# Patient Record
Sex: FEMALE | Race: WHITE | Employment: UNEMPLOYED | URBAN - METROPOLITAN AREA
[De-identification: names, ages, dates, MRNs, and addresses within clinical notes are randomized per-mention and may not be internally consistent; named-entity substitution may affect disease eponyms.]

---

## 2019-03-03 ENCOUNTER — HOSPITAL ENCOUNTER (OUTPATIENT)
Age: 64
Setting detail: OBSERVATION
Discharge: HOME | End: 2019-03-06
Attending: EMERGENCY MEDICINE | Admitting: FAMILY MEDICINE
Payer: MEDICARE

## 2019-03-03 ENCOUNTER — APPOINTMENT (OUTPATIENT)
Dept: CT IMAGING | Age: 64
End: 2019-03-03
Attending: EMERGENCY MEDICINE
Payer: MEDICARE

## 2019-03-03 DIAGNOSIS — R41.82 ALTERED MENTAL STATUS, UNSPECIFIED ALTERED MENTAL STATUS TYPE: Primary | ICD-10-CM

## 2019-03-03 PROBLEM — J45.901 ASTHMA EXACERBATION: Status: ACTIVE | Noted: 2019-03-03

## 2019-03-03 PROBLEM — G93.40 ENCEPHALOPATHY: Status: ACTIVE | Noted: 2019-03-03

## 2019-03-03 LAB
ALBUMIN SERPL-MCNC: 4.4 G/DL (ref 3.2–4.6)
ALBUMIN/GLOB SERPL: 1.1 {RATIO}
ALP SERPL-CCNC: 69 U/L (ref 50–136)
ALT SERPL-CCNC: 25 U/L (ref 12–65)
ANION GAP SERPL CALC-SCNC: 10 MMOL/L
AST SERPL-CCNC: 33 U/L (ref 15–37)
BASOPHILS # BLD: 0.1 K/UL (ref 0–0.2)
BASOPHILS NFR BLD: 1 % (ref 0–2)
BILIRUB SERPL-MCNC: 0.6 MG/DL (ref 0.2–1.1)
BUN SERPL-MCNC: 20 MG/DL (ref 8–23)
CALCIUM SERPL-MCNC: 9.9 MG/DL (ref 8.3–10.4)
CHLORIDE SERPL-SCNC: 106 MMOL/L (ref 98–107)
CO2 SERPL-SCNC: 20 MMOL/L (ref 21–32)
CREAT SERPL-MCNC: 0.86 MG/DL (ref 0.6–1)
DIFFERENTIAL METHOD BLD: ABNORMAL
EOSINOPHIL # BLD: 0 K/UL (ref 0–0.8)
EOSINOPHIL NFR BLD: 0 % (ref 0.5–7.8)
ERYTHROCYTE [DISTWIDTH] IN BLOOD BY AUTOMATED COUNT: 13.7 % (ref 11.9–14.6)
GLOBULIN SER CALC-MCNC: 4 G/DL (ref 2.3–3.5)
GLUCOSE SERPL-MCNC: 110 MG/DL (ref 65–100)
HCT VFR BLD AUTO: 44 % (ref 35.8–46.3)
HGB BLD-MCNC: 15 G/DL (ref 11.7–15.4)
IMM GRANULOCYTES # BLD AUTO: 0 K/UL (ref 0–0.5)
IMM GRANULOCYTES NFR BLD AUTO: 0 % (ref 0–5)
LYMPHOCYTES # BLD: 3.1 K/UL (ref 0.5–4.6)
LYMPHOCYTES NFR BLD: 34 % (ref 13–44)
MCH RBC QN AUTO: 29.2 PG (ref 26.1–32.9)
MCHC RBC AUTO-ENTMCNC: 34.1 G/DL (ref 31.4–35)
MCV RBC AUTO: 85.8 FL (ref 79.6–97.8)
MONOCYTES # BLD: 0.6 K/UL (ref 0.1–1.3)
MONOCYTES NFR BLD: 7 % (ref 4–12)
NEUTS SEG # BLD: 5.5 K/UL (ref 1.7–8.2)
NEUTS SEG NFR BLD: 59 % (ref 43–78)
NRBC # BLD: 0 K/UL (ref 0–0.2)
PLATELET # BLD AUTO: 256 K/UL (ref 150–450)
PMV BLD AUTO: 10.8 FL (ref 9.4–12.3)
POTASSIUM SERPL-SCNC: 4.9 MMOL/L (ref 3.5–5.1)
PROT SERPL-MCNC: 8.4 G/DL
RBC # BLD AUTO: 5.13 M/UL (ref 4.05–5.2)
SODIUM SERPL-SCNC: 136 MMOL/L (ref 136–145)
TROPONIN I SERPL-MCNC: <0.02 NG/ML (ref 0.02–0.05)
WBC # BLD AUTO: 9.3 K/UL (ref 4.3–11.1)

## 2019-03-03 PROCEDURE — 99218 HC RM OBSERVATION: CPT

## 2019-03-03 PROCEDURE — 96374 THER/PROPH/DIAG INJ IV PUSH: CPT

## 2019-03-03 PROCEDURE — 74011250636 HC RX REV CODE- 250/636: Performed by: EMERGENCY MEDICINE

## 2019-03-03 PROCEDURE — 80053 COMPREHEN METABOLIC PANEL: CPT

## 2019-03-03 PROCEDURE — 85025 COMPLETE CBC W/AUTO DIFF WBC: CPT

## 2019-03-03 PROCEDURE — 84484 ASSAY OF TROPONIN QUANT: CPT

## 2019-03-03 PROCEDURE — 96360 HYDRATION IV INFUSION INIT: CPT | Performed by: EMERGENCY MEDICINE

## 2019-03-03 PROCEDURE — 96375 TX/PRO/DX INJ NEW DRUG ADDON: CPT

## 2019-03-03 PROCEDURE — 99285 EMERGENCY DEPT VISIT HI MDM: CPT | Performed by: EMERGENCY MEDICINE

## 2019-03-03 PROCEDURE — 70450 CT HEAD/BRAIN W/O DYE: CPT

## 2019-03-03 PROCEDURE — 96361 HYDRATE IV INFUSION ADD-ON: CPT | Performed by: EMERGENCY MEDICINE

## 2019-03-03 PROCEDURE — 74011250636 HC RX REV CODE- 250/636: Performed by: FAMILY MEDICINE

## 2019-03-03 PROCEDURE — 93005 ELECTROCARDIOGRAM TRACING: CPT | Performed by: EMERGENCY MEDICINE

## 2019-03-03 PROCEDURE — 77030020263 HC SOL INJ SOD CL0.9% LFCR 1000ML

## 2019-03-03 RX ORDER — SODIUM CHLORIDE 0.9 % (FLUSH) 0.9 %
5-40 SYRINGE (ML) INJECTION AS NEEDED
Status: DISCONTINUED | OUTPATIENT
Start: 2019-03-03 | End: 2019-03-06 | Stop reason: HOSPADM

## 2019-03-03 RX ORDER — BISACODYL 5 MG
5 TABLET, DELAYED RELEASE (ENTERIC COATED) ORAL DAILY PRN
Status: DISCONTINUED | OUTPATIENT
Start: 2019-03-03 | End: 2019-03-05

## 2019-03-03 RX ORDER — HYDROMORPHONE HYDROCHLORIDE 4 MG/1
4 TABLET ORAL 2 TIMES DAILY
COMMUNITY

## 2019-03-03 RX ORDER — SODIUM CHLORIDE 0.9 % (FLUSH) 0.9 %
5-40 SYRINGE (ML) INJECTION EVERY 8 HOURS
Status: DISCONTINUED | OUTPATIENT
Start: 2019-03-04 | End: 2019-03-06 | Stop reason: HOSPADM

## 2019-03-03 RX ORDER — HYDROMORPHONE HYDROCHLORIDE 2 MG/ML
0.5 INJECTION, SOLUTION INTRAMUSCULAR; INTRAVENOUS; SUBCUTANEOUS
Status: DISCONTINUED | OUTPATIENT
Start: 2019-03-03 | End: 2019-03-05

## 2019-03-03 RX ORDER — DIPHENHYDRAMINE HYDROCHLORIDE 50 MG/ML
25 INJECTION, SOLUTION INTRAMUSCULAR; INTRAVENOUS
Status: DISCONTINUED | OUTPATIENT
Start: 2019-03-03 | End: 2019-03-06 | Stop reason: HOSPADM

## 2019-03-03 RX ORDER — ONDANSETRON 2 MG/ML
4 INJECTION INTRAMUSCULAR; INTRAVENOUS
Status: DISCONTINUED | OUTPATIENT
Start: 2019-03-03 | End: 2019-03-06 | Stop reason: HOSPADM

## 2019-03-03 RX ORDER — HYDROMORPHONE HYDROCHLORIDE 2 MG/ML
1 INJECTION, SOLUTION INTRAMUSCULAR; INTRAVENOUS; SUBCUTANEOUS ONCE
Status: COMPLETED | OUTPATIENT
Start: 2019-03-04 | End: 2019-03-03

## 2019-03-03 RX ORDER — PROMETHAZINE HYDROCHLORIDE 25 MG/1
25 TABLET ORAL
COMMUNITY

## 2019-03-03 RX ORDER — ACETAMINOPHEN 325 MG/1
650 TABLET ORAL
Status: DISCONTINUED | OUTPATIENT
Start: 2019-03-03 | End: 2019-03-06 | Stop reason: HOSPADM

## 2019-03-03 RX ORDER — DICYCLOMINE HYDROCHLORIDE 20 MG/1
20 TABLET ORAL EVERY 6 HOURS
COMMUNITY

## 2019-03-03 RX ORDER — SODIUM CHLORIDE 9 MG/ML
100 INJECTION, SOLUTION INTRAVENOUS CONTINUOUS
Status: DISCONTINUED | OUTPATIENT
Start: 2019-03-04 | End: 2019-03-06 | Stop reason: HOSPADM

## 2019-03-03 RX ORDER — HEPARIN SODIUM 5000 [USP'U]/ML
5000 INJECTION, SOLUTION INTRAVENOUS; SUBCUTANEOUS EVERY 8 HOURS
Status: DISCONTINUED | OUTPATIENT
Start: 2019-03-04 | End: 2019-03-06 | Stop reason: HOSPADM

## 2019-03-03 RX ADMIN — HYDROMORPHONE HYDROCHLORIDE 1 MG: 2 INJECTION, SOLUTION INTRAMUSCULAR; INTRAVENOUS; SUBCUTANEOUS at 23:45

## 2019-03-03 RX ADMIN — DIPHENHYDRAMINE HYDROCHLORIDE 25 MG: 50 INJECTION, SOLUTION INTRAMUSCULAR; INTRAVENOUS at 23:45

## 2019-03-03 RX ADMIN — SODIUM CHLORIDE 1000 ML: 900 INJECTION, SOLUTION INTRAVENOUS at 18:58

## 2019-03-03 RX ADMIN — SODIUM CHLORIDE 100 ML/HR: 900 INJECTION, SOLUTION INTRAVENOUS at 23:45

## 2019-03-03 NOTE — ED TRIAGE NOTES
Pt arrives via POV from home with family, pt takes dilaudid, bupivicaine, clonidine through PCA pump x12 years, pump broke 1 week ago, pt now going through withdrawls, gasping for breaths, in and out of consciousness, confused, lethargic, not eating or drinking. Pt family took her to pain management for PO dilaudid and withdrawal medications with no real relief just withdrawal symptoms.

## 2019-03-03 NOTE — ED PROVIDER NOTES
Patient is a 60-year-old female who presents with altered mental status. Patient is chronically on Dilaudid , bupivacaine, and clonidine by PCA pump for the past 12 years , which broke one week ago. Family states that she has been confused and in and out of consciousness for the past 3 days. States she was seen by her pain doctor 3 days ago and the pump was \"dead\" at that time. She is supplemented with by mouth Dilaudid , however, states she has not been eating or drinking at all since that time. To me the patient denies any complaints other than the sensation of \"crawling\" on her skin. Patient specifically denies any chest pain, no shortness of breath, no nausea or vomiting, no fevers or chills. No past medical history on file. No past surgical history on file. No family history on file. Social History Socioeconomic History  Marital status:  Spouse name: Not on file  Number of children: Not on file  Years of education: Not on file  Highest education level: Not on file Social Needs  Financial resource strain: Not on file  Food insecurity - worry: Not on file  Food insecurity - inability: Not on file  Transportation needs - medical: Not on file  Transportation needs - non-medical: Not on file Occupational History  Not on file Tobacco Use  Smoking status: Not on file Substance and Sexual Activity  Alcohol use: Not on file  Drug use: Not on file  Sexual activity: Not on file Other Topics Concern  Not on file Social History Narrative  Not on file ALLERGIES: Patient has no known allergies. Review of Systems Constitutional: Negative for chills and fever. HENT: Negative for rhinorrhea and sore throat. Eyes: Negative for visual disturbance. Respiratory: Negative for cough and shortness of breath. Cardiovascular: Negative for chest pain and leg swelling. Gastrointestinal: Negative for abdominal pain, diarrhea, nausea and vomiting. Genitourinary: Negative for dysuria. Musculoskeletal: Negative for back pain and neck pain. Skin: Negative for rash. Neurological: Negative for weakness and headaches. Psychiatric/Behavioral: The patient is not nervous/anxious. Vitals:  
 03/03/19 1709 BP: 121/84 Pulse: 80 Resp: 12 Temp: 98.3 °F (36.8 °C) SpO2: 98% Weight: 72.6 kg (160 lb) Height: 5' 8\" (1.727 m) Physical Exam  
Constitutional: She is oriented to person, place, and time. She appears well-developed and well-nourished. HENT:  
Head: Normocephalic. Right Ear: External ear normal.  
Left Ear: External ear normal.  
Eyes: Conjunctivae and EOM are normal. Pupils are equal, round, and reactive to light. Neck: Normal range of motion. Neck supple. No tracheal deviation present. Cardiovascular: Normal rate, regular rhythm, normal heart sounds and intact distal pulses. No murmur heard. Pulmonary/Chest: Effort normal and breath sounds normal. No respiratory distress. Abdominal: Soft. There is no tenderness. Musculoskeletal: Normal range of motion. Neurological: She is alert and oriented to person, place, and time. No cranial nerve deficit. Cn 2-12 fully intact, strength and sensation 5/5 in all extremities, negative pronator drift, ambulates without difficulty, visual fields fully intact, EOMI, finger to nose normal. no focal deficits appreciated. Skin: No rash noted. Nursing note and vitals reviewed. MDM Number of Diagnoses or Management Options Amount and/or Complexity of Data Reviewed Clinical lab tests: ordered and reviewed Tests in the radiology section of CPT®: ordered and reviewed Tests in the medicine section of CPT®: ordered and reviewed Review and summarize past medical records: yes Risk of Complications, Morbidity, and/or Mortality Presenting problems: high Diagnostic procedures: high Management options: high Patient Progress Patient progress: stable Procedures Recent Results (from the past 12 hour(s)) CBC WITH AUTOMATED DIFF Collection Time: 03/03/19  5:39 PM  
Result Value Ref Range WBC 9.3 4.3 - 11.1 K/uL  
 RBC 5.13 4.05 - 5.2 M/uL  
 HGB 15.0 11.7 - 15.4 g/dL HCT 44.0 35.8 - 46.3 % MCV 85.8 79.6 - 97.8 FL  
 MCH 29.2 26.1 - 32.9 PG  
 MCHC 34.1 31.4 - 35.0 g/dL  
 RDW 13.7 11.9 - 14.6 % PLATELET 260 754 - 774 K/uL MPV 10.8 9.4 - 12.3 FL ABSOLUTE NRBC 0.00 0.0 - 0.2 K/uL  
 DF AUTOMATED NEUTROPHILS 59 43 - 78 % LYMPHOCYTES 34 13 - 44 % MONOCYTES 7 4.0 - 12.0 % EOSINOPHILS 0 (L) 0.5 - 7.8 % BASOPHILS 1 0.0 - 2.0 % IMMATURE GRANULOCYTES 0 0.0 - 5.0 %  
 ABS. NEUTROPHILS 5.5 1.7 - 8.2 K/UL  
 ABS. LYMPHOCYTES 3.1 0.5 - 4.6 K/UL  
 ABS. MONOCYTES 0.6 0.1 - 1.3 K/UL  
 ABS. EOSINOPHILS 0.0 0.0 - 0.8 K/UL  
 ABS. BASOPHILS 0.1 0.0 - 0.2 K/UL  
 ABS. IMM. GRANS. 0.0 0.0 - 0.5 K/UL METABOLIC PANEL, COMPREHENSIVE Collection Time: 03/03/19  5:39 PM  
Result Value Ref Range Sodium 136 136 - 145 mmol/L Potassium 4.9 3.5 - 5.1 mmol/L Chloride 106 98 - 107 mmol/L  
 CO2 20 (L) 21 - 32 mmol/L Anion gap 10 mmol/L Glucose 110 (H) 65 - 100 mg/dL BUN 20 8 - 23 MG/DL Creatinine 0.86 0.6 - 1.0 MG/DL  
 GFR est AA >60 >60 ml/min/1.73m2 GFR est non-AA >60 ml/min/1.73m2 Calcium 9.9 8.3 - 10.4 MG/DL Bilirubin, total 0.6 0.2 - 1.1 MG/DL  
 ALT (SGPT) 25 12 - 65 U/L  
 AST (SGOT) 33 15 - 37 U/L Alk. phosphatase 69 50 - 136 U/L Protein, total 8.4 g/dL Albumin 4.4 3.2 - 4.6 g/dL Globulin 4.0 (H) 2.3 - 3.5 g/dL A-G Ratio 1.1 TROPONIN I Collection Time: 03/03/19  5:39 PM  
Result Value Ref Range Troponin-I, Qt. <0.02 (L) 0.02 - 0.05 NG/ML Ct Head Wo Cont Result Date: 3/3/2019 EXAM: CT head without contrast. ADDITIONAL CLINICAL INFORMATION: Altered mental status. COMPARISON: None. Multiple axial images obtained through the brain without intravenous contrast. Radiation dose reduction techniques were used for this study: All CT scans performed at this facility use one or all of the following: Automated exposure control, adjustment of the mA and/or kVp according to patient's size, iterative reconstruction. FINDINGS: No evidence of intracranial mass, hemorrhage, or large territorial infarct. The ventricles are normal in size and position. The basal cisterns are patent. No extra-axial fluid collection or mass effect. The orbital contents are within normal limits. The paranasal sinuses are clear. The mastoid air cells and middle ears are clear. No significant osseous or extracranial soft tissue lesions. IMPRESSION: 1. No evidence of acute intracranial abnormality. 15-year-old female with altered mental status: 
 
  
Patient with AMS unclear etiology of withdrawal vs. Infection vs medication reaction to PO medications replacing pump. Will admit for Obs and further management.

## 2019-03-04 PROBLEM — F11.93 OPIOID WITHDRAWAL (HCC): Status: ACTIVE | Noted: 2019-03-04

## 2019-03-04 PROBLEM — G93.41 ACUTE METABOLIC ENCEPHALOPATHY: Status: ACTIVE | Noted: 2019-03-03

## 2019-03-04 LAB
ANION GAP SERPL CALC-SCNC: 9 MMOL/L
ATRIAL RATE: 49 BPM
BUN SERPL-MCNC: 18 MG/DL (ref 8–23)
CALCIUM SERPL-MCNC: 8.4 MG/DL (ref 8.3–10.4)
CALCULATED P AXIS, ECG09: 50 DEGREES
CALCULATED R AXIS, ECG10: 22 DEGREES
CALCULATED T AXIS, ECG11: 55 DEGREES
CHLORIDE SERPL-SCNC: 109 MMOL/L (ref 98–107)
CO2 SERPL-SCNC: 22 MMOL/L (ref 21–32)
CREAT SERPL-MCNC: 0.79 MG/DL (ref 0.6–1)
DIAGNOSIS, 93000: NORMAL
ERYTHROCYTE [DISTWIDTH] IN BLOOD BY AUTOMATED COUNT: 13.2 % (ref 11.9–14.6)
GLUCOSE SERPL-MCNC: 107 MG/DL (ref 65–100)
HCT VFR BLD AUTO: 36.9 % (ref 35.8–46.3)
HGB BLD-MCNC: 12 G/DL (ref 11.7–15.4)
MCH RBC QN AUTO: 29.2 PG (ref 26.1–32.9)
MCHC RBC AUTO-ENTMCNC: 32.5 G/DL (ref 31.4–35)
MCV RBC AUTO: 89.8 FL (ref 79.6–97.8)
NRBC # BLD: 0 K/UL (ref 0–0.2)
P-R INTERVAL, ECG05: 132 MS
PLATELET # BLD AUTO: 244 K/UL (ref 150–450)
PMV BLD AUTO: 10 FL (ref 9.4–12.3)
POTASSIUM SERPL-SCNC: 3.5 MMOL/L (ref 3.5–5.1)
Q-T INTERVAL, ECG07: 468 MS
QRS DURATION, ECG06: 70 MS
QTC CALCULATION (BEZET), ECG08: 422 MS
RBC # BLD AUTO: 4.11 M/UL (ref 4.05–5.2)
SODIUM SERPL-SCNC: 140 MMOL/L (ref 136–145)
VENTRICULAR RATE, ECG03: 49 BPM
WBC # BLD AUTO: 6.5 K/UL (ref 4.3–11.1)

## 2019-03-04 PROCEDURE — 80048 BASIC METABOLIC PNL TOTAL CA: CPT

## 2019-03-04 PROCEDURE — 96376 TX/PRO/DX INJ SAME DRUG ADON: CPT

## 2019-03-04 PROCEDURE — 74011250636 HC RX REV CODE- 250/636: Performed by: FAMILY MEDICINE

## 2019-03-04 PROCEDURE — 99218 HC RM OBSERVATION: CPT

## 2019-03-04 PROCEDURE — 77030020263 HC SOL INJ SOD CL0.9% LFCR 1000ML

## 2019-03-04 PROCEDURE — 85027 COMPLETE CBC AUTOMATED: CPT

## 2019-03-04 PROCEDURE — 36415 COLL VENOUS BLD VENIPUNCTURE: CPT

## 2019-03-04 PROCEDURE — 96372 THER/PROPH/DIAG INJ SC/IM: CPT

## 2019-03-04 RX ADMIN — HEPARIN SODIUM 5000 UNITS: 5000 INJECTION INTRAVENOUS; SUBCUTANEOUS at 21:41

## 2019-03-04 RX ADMIN — Medication 10 ML: at 06:07

## 2019-03-04 RX ADMIN — HYDROMORPHONE HYDROCHLORIDE 0.5 MG: 2 INJECTION, SOLUTION INTRAMUSCULAR; INTRAVENOUS; SUBCUTANEOUS at 15:55

## 2019-03-04 RX ADMIN — HEPARIN SODIUM 5000 UNITS: 5000 INJECTION INTRAVENOUS; SUBCUTANEOUS at 06:07

## 2019-03-04 RX ADMIN — HYDROMORPHONE HYDROCHLORIDE 0.5 MG: 2 INJECTION, SOLUTION INTRAMUSCULAR; INTRAVENOUS; SUBCUTANEOUS at 21:49

## 2019-03-04 RX ADMIN — HYDROMORPHONE HYDROCHLORIDE 0.5 MG: 2 INJECTION, SOLUTION INTRAMUSCULAR; INTRAVENOUS; SUBCUTANEOUS at 10:04

## 2019-03-04 RX ADMIN — DIPHENHYDRAMINE HYDROCHLORIDE 25 MG: 50 INJECTION, SOLUTION INTRAMUSCULAR; INTRAVENOUS at 19:55

## 2019-03-04 RX ADMIN — HEPARIN SODIUM 5000 UNITS: 5000 INJECTION INTRAVENOUS; SUBCUTANEOUS at 14:35

## 2019-03-04 RX ADMIN — HYDROMORPHONE HYDROCHLORIDE 0.5 MG: 2 INJECTION, SOLUTION INTRAMUSCULAR; INTRAVENOUS; SUBCUTANEOUS at 04:56

## 2019-03-04 NOTE — PROGRESS NOTES
Pt restless, agitated and complaining of her skin feels like it is crawling. Benadryl and Dilaudid administered per MAR. Will continue to monitor.

## 2019-03-04 NOTE — PROGRESS NOTES
Shift assessment complete. Pt resting quietly in bed. No signs of acute distress. Resp even and unlabored. Alert and oriented x3. Call light within reach. Pt instructed to call for assistance.

## 2019-03-04 NOTE — PROGRESS NOTES
Care Management Interventions PCP Verified by CM: Yes Mode of Transport at Discharge: Other (see comment) Transition of Care Consult (CM Consult): Other Current Support Network: Own Home Confirm Follow Up Transport: Family Plan discussed with Pt/Family/Caregiver: Yes Freedom of Choice Offered: Yes Discharge Location Discharge Placement: Home Visited with pt regarding plans for discharge, pt lives at home with spouse(he has dementia), she is inde with ADL's. Here due to pain pump malfunction and withdrawal symptoms. She goes to [facility] in [place] #[phone]. Per the office, pt has medication for breakthrough pain until she has her pump replaced. I spoke with [facility] @ there office, she called Dr. Kurt Garcia office(they are to sched the pt's pump replacement. They have received the referral but have not sched. Her appt at this time, they will call the pt. Dr. Virgen Reid and pt aware. 3/5 Saw pt in interdisciplinarily rounds, plan of care and discharge date/ location discussed. Per the hospitalitis plan to d/c pt tomorrow in am, dtr to delilah pt straight to Dr. Kurt Garcia office in [place] for them to assess her pain pump.

## 2019-03-04 NOTE — ROUTINE PROCESS
TRANSFER - OUT REPORT: 
 
Verbal report given to [address] [place] [address] (name) on Danuta Morris  being transferred to Med Surg (unit) for routine progression of care Report consisted of patients Situation, Background, Assessment and  
Recommendations(SBAR). Information from the following report(s) SBAR, ED Summary, [facility] and Recent Results was reviewed with the receiving nurse. Lines:  
Peripheral IV 03/03/19 Right Forearm (Active) Site Assessment Clean, dry, & intact 3/3/2019  5:37 PM  
Phlebitis Assessment 0 3/3/2019  5:37 PM  
Infiltration Assessment 0 3/3/2019  5:37 PM  
Dressing Status Clean, dry, & intact 3/3/2019  5:37 PM  
Dressing Type Tape;Transparent 3/3/2019  5:37 PM  
Hub Color/Line Status Pink;Flushed;Patent 3/3/2019  5:37 PM  
Action Taken Blood drawn 3/3/2019  5:37 PM  
  
 
Opportunity for questions and clarification was provided. Patient transported with: 
 [employer]

## 2019-03-04 NOTE — PROGRESS NOTES
Initial visit was made, emotional support and a spiritual presence were provided. Patient was reading her Bible. Odalys Raya

## 2019-03-04 NOTE — PROGRESS NOTES
Problem: Interdisciplinary Rounds Goal: Interdisciplinary Rounds Interdisciplinary team rounds were held 3/4/2019 with the following team members:Care Management, Nursing, Nutrition, Pharmacy, Physical Therapy and Physician. Plan of care discussed. See clinical pathway and/or care plan for interventions and desired outcomes.

## 2019-03-04 NOTE — H&P
HOSPITALIST H&P/CONSULTNAME:  Tanisha Robertson Age:  59 y.o. 
:   1955 MRN:   [id number] PCP: Leobardo Bryant MD 
Consulting MD: Treatment Team: Attending Provider: Cricket Segundo MD 
HPI:  
Patient is a 58yo F with hx chronic pain who presents with altered mental status. She has an intrathecal pain pump which has been malfunctioning for a week. She has been seen in her pain clinic in [facility] and confirmed her pump is malfunctioning. Most recent pump settings: dilaudid 6.4mg/day, bupivicaine 3.2mg/day, clonidine 16.9mcg/day. She was started on PO dilaudid and also lucemyra for withdrawal symptoms. The patient has continued to complain of crawling and itching sensation on skin and has become progressively less oriented. Initially was awake for several days, then became confused. History provided mostly by son. Complete ROS done and is as stated in HPI or otherwise negative No past medical history on file. htn, chronic pain No past surgical history on file. knee replacements, intrathecal pump Prior to Admission Medications Prescriptions Last Dose Informant Patient Reported? Taking? HYDROmorphone (DILAUDID) 4 mg tablet 3/3/2019 at Unknown time  Yes Yes Sig: Take 4 mg by mouth two (2) times a day. dicyclomine (BENTYL) 20 mg tablet 3/3/2019 at Unknown time  Yes Yes Sig: Take 20 mg by mouth every six (6) hours. promethazine (PHENERGAN) 25 mg tablet 3/3/2019 at Unknown time  Yes Yes Sig: Take 25 mg by mouth every eight (8) hours as needed for Nausea. Facility-Administered Medications: None No Known Allergies Social History Tobacco Use  Smoking status: Not on file Substance Use Topics  Alcohol use: Not on file No family history on file. no significant Objective:  
 
Visit Vitals /69 (BP 1 Location: Left arm, BP Patient Position: At rest) Pulse (!) 49 Temp 96.1 °F (35.6 °C) Resp 22 Ht 5' 8\" (1.727 m) Wt 72.6 kg (160 lb) SpO2 95% BMI 24.33 kg/m² Temp (24hrs), Av.2 °F (36.2 °C), Min:96.1 °F (35.6 °C), Max:98.3 °F (36.8 °C) Oxygen Therapy O2 Sat (%): 95 % (19) Pulse via Oximetry: 50 beats per minute (19) O2 Device: Room air (19 170) Physical Exam: 
General:    Laying in bed, scratching arms Head:   Normocephalic, without obvious abnormality, atraumatic. Nose:  Nares normal. No drainage or sinus tenderness. Lungs:   Clear to auscultation bilaterally. No Wheezing or Rhonchi. No rales. Heart:   Regular rate and rhythm,  no murmur, rub or gallop. Abdomen:   Soft, non-tender. Not distended. Bowel sounds normal.  
Extremities: No cyanosis. No edema. No clubbing Skin:     Excoriations b/l forearms Neurologic: Alert, oriented x2 Data Review:  
Recent Results (from the past 24 hour(s)) CBC WITH AUTOMATED DIFF Collection Time: 19  5:39 PM  
Result Value Ref Range WBC 9.3 4.3 - 11.1 K/uL  
 RBC 5.13 4.05 - 5.2 M/uL  
 HGB 15.0 11.7 - 15.4 g/dL HCT 44.0 35.8 - 46.3 % MCV 85.8 79.6 - 97.8 FL  
 MCH 29.2 26.1 - 32.9 PG  
 MCHC 34.1 31.4 - 35.0 g/dL  
 RDW 13.7 11.9 - 14.6 % PLATELET 624 832 - 170 K/uL MPV 10.8 9.4 - 12.3 FL ABSOLUTE NRBC 0.00 0.0 - 0.2 K/uL  
 DF AUTOMATED NEUTROPHILS 59 43 - 78 % LYMPHOCYTES 34 13 - 44 % MONOCYTES 7 4.0 - 12.0 % EOSINOPHILS 0 (L) 0.5 - 7.8 % BASOPHILS 1 0.0 - 2.0 % IMMATURE GRANULOCYTES 0 0.0 - 5.0 %  
 ABS. NEUTROPHILS 5.5 1.7 - 8.2 K/UL  
 ABS. LYMPHOCYTES 3.1 0.5 - 4.6 K/UL  
 ABS. MONOCYTES 0.6 0.1 - 1.3 K/UL  
 ABS. EOSINOPHILS 0.0 0.0 - 0.8 K/UL  
 ABS. BASOPHILS 0.1 0.0 - 0.2 K/UL  
 ABS. IMM. GRANS. 0.0 0.0 - 0.5 K/UL METABOLIC PANEL, COMPREHENSIVE Collection Time: 19  5:39 PM  
Result Value Ref Range Sodium 136 136 - 145 mmol/L Potassium 4.9 3.5 - 5.1 mmol/L Chloride 106 98 - 107 mmol/L  
 CO2 20 (L) 21 - 32 mmol/L Anion gap 10 mmol/L Glucose 110 (H) 65 - 100 mg/dL BUN 20 8 - 23 MG/DL Creatinine 0.86 0.6 - 1.0 MG/DL  
 GFR est AA >60 >60 ml/min/1.73m2 GFR est non-AA >60 ml/min/1.73m2 Calcium 9.9 8.3 - 10.4 MG/DL Bilirubin, total 0.6 0.2 - 1.1 MG/DL  
 ALT (SGPT) 25 12 - 65 U/L  
 AST (SGOT) 33 15 - 37 U/L Alk. phosphatase 69 50 - 136 U/L Protein, total 8.4 g/dL Albumin 4.4 3.2 - 4.6 g/dL Globulin 4.0 (H) 2.3 - 3.5 g/dL A-G Ratio 1.1 TROPONIN I Collection Time: 03/03/19  5:39 PM  
Result Value Ref Range Troponin-I, Qt. <0.02 (L) 0.02 - 0.05 NG/ML Imaging [facility] CT HEAD WO CONT Final Result IMPRESSION:  
1. No evidence of acute intracranial abnormality. Assessment and Plan: Active Hospital Problems Diagnosis Date Noted  Encephalopathy 03/03/2019 PLAN: 
Encephalopathy from opioid withdrawal and medication side effects. Prn benadryl, dilaudid, antiemetics. D/c lucemyra. Somewhat improved after fluids, continue. No infectious signs. If sx can be controlled and mental status improved, will DC to continue home PO meds until pending intrathecal pump replacement. DVT PPx: hsq Code Status: full Anticipated discharge: 1-2 midnights Signed By: Miryam Davidson MD   
 March 3, 2019

## 2019-03-04 NOTE — PROGRESS NOTES
TRANSFER - IN REPORT: 
 
Verbal report received from Summer RN(name) on Emile Nathan  being received from ED (unit) for routine progression of care Report consisted of patients Situation, Background, Assessment and  
Recommendations(SBAR). Information from the following report(s) SBAR was reviewed with the receiving nurse. Opportunity for questions and clarification was provided. Assessment will be completed upon patients arrival to unit and care will be assumed.

## 2019-03-04 NOTE — PROGRESS NOTES
03/03/19 2130 Dual Skin Pressure Injury Assessment Dual Skin Pressure Injury Assessment WDL Second Care Provider (Based on [address] [place] [address]) Omer Coreas [facility] Skin Integumentary Skin Integumentary (WDL) WDL Skin Color Appropriate for ethnicity Skin Condition/Temp Warm;Dry Skin Integrity Scars (comment); Abrasion 
(scars on knees, abrasion to LUE) Turgor Non-tenting

## 2019-03-04 NOTE — PROGRESS NOTES
Dr. Fernandez Friday spoke with Dr. Junior Lancaster regarding fixing pt pain pump, Dr. Junior Lancaster states they do not do this procedure. Pt will see Dr. JORGENSEN [facility] outpatient on March 6.

## 2019-03-04 NOTE — PROGRESS NOTES
Admission assessment complete. Patient A&O x 4. Respirations present, even and unlabored. Breath sounds clear. Patient on room air. Heart sounds regular. Bowel sounds active in all 4 quadrants. Abdomen soft and non tender. Family at bedside. Patient resting in bed quietly. Patient needs met. No distress noted. Bed low and locked. Call light within reach. Will continue to monitor hourly during shift.

## 2019-03-05 PROCEDURE — 77030020263 HC SOL INJ SOD CL0.9% LFCR 1000ML

## 2019-03-05 PROCEDURE — 74011250637 HC RX REV CODE- 250/637: Performed by: HOSPITALIST

## 2019-03-05 PROCEDURE — 99218 HC RM OBSERVATION: CPT

## 2019-03-05 PROCEDURE — 96372 THER/PROPH/DIAG INJ SC/IM: CPT

## 2019-03-05 PROCEDURE — 96361 HYDRATE IV INFUSION ADD-ON: CPT

## 2019-03-05 PROCEDURE — 74011250636 HC RX REV CODE- 250/636: Performed by: HOSPITALIST

## 2019-03-05 PROCEDURE — 74011250636 HC RX REV CODE- 250/636: Performed by: FAMILY MEDICINE

## 2019-03-05 PROCEDURE — 97161 PT EVAL LOW COMPLEX 20 MIN: CPT

## 2019-03-05 PROCEDURE — 96376 TX/PRO/DX INJ SAME DRUG ADON: CPT

## 2019-03-05 RX ORDER — ZOLPIDEM TARTRATE 5 MG/1
5 TABLET ORAL
Status: DISCONTINUED | OUTPATIENT
Start: 2019-03-05 | End: 2019-03-06 | Stop reason: HOSPADM

## 2019-03-05 RX ORDER — OXYCODONE AND ACETAMINOPHEN 5; 325 MG/1; MG/1
1 TABLET ORAL EVERY 8 HOURS
Status: DISCONTINUED | OUTPATIENT
Start: 2019-03-05 | End: 2019-03-06 | Stop reason: HOSPADM

## 2019-03-05 RX ORDER — HYDROMORPHONE HYDROCHLORIDE 2 MG/ML
0.5 INJECTION, SOLUTION INTRAMUSCULAR; INTRAVENOUS; SUBCUTANEOUS EVERY 4 HOURS
Status: DISCONTINUED | OUTPATIENT
Start: 2019-03-05 | End: 2019-03-06 | Stop reason: HOSPADM

## 2019-03-05 RX ORDER — OXYCODONE AND ACETAMINOPHEN 5; 325 MG/1; MG/1
1 TABLET ORAL
Status: DISCONTINUED | OUTPATIENT
Start: 2019-03-05 | End: 2019-03-05

## 2019-03-05 RX ORDER — LOPERAMIDE HYDROCHLORIDE 2 MG/1
2 CAPSULE ORAL
Status: DISCONTINUED | OUTPATIENT
Start: 2019-03-05 | End: 2019-03-06 | Stop reason: HOSPADM

## 2019-03-05 RX ADMIN — OXYCODONE AND ACETAMINOPHEN 1 TABLET: 5; 325 TABLET ORAL at 10:43

## 2019-03-05 RX ADMIN — SODIUM CHLORIDE 100 ML/HR: 900 INJECTION, SOLUTION INTRAVENOUS at 19:56

## 2019-03-05 RX ADMIN — HYDROMORPHONE HYDROCHLORIDE 0.5 MG: 2 INJECTION, SOLUTION INTRAMUSCULAR; INTRAVENOUS; SUBCUTANEOUS at 15:52

## 2019-03-05 RX ADMIN — HYDROMORPHONE HYDROCHLORIDE 0.5 MG: 2 INJECTION, SOLUTION INTRAMUSCULAR; INTRAVENOUS; SUBCUTANEOUS at 12:07

## 2019-03-05 RX ADMIN — Medication 10 ML: at 21:11

## 2019-03-05 RX ADMIN — OXYCODONE AND ACETAMINOPHEN 1 TABLET: 5; 325 TABLET ORAL at 19:39

## 2019-03-05 RX ADMIN — HYDROMORPHONE HYDROCHLORIDE 0.5 MG: 2 INJECTION, SOLUTION INTRAMUSCULAR; INTRAVENOUS; SUBCUTANEOUS at 21:08

## 2019-03-05 RX ADMIN — ZOLPIDEM TARTRATE 5 MG: 5 TABLET ORAL at 21:08

## 2019-03-05 RX ADMIN — HYDROMORPHONE HYDROCHLORIDE 0.5 MG: 2 INJECTION, SOLUTION INTRAMUSCULAR; INTRAVENOUS; SUBCUTANEOUS at 04:52

## 2019-03-05 RX ADMIN — HEPARIN SODIUM 5000 UNITS: 5000 INJECTION INTRAVENOUS; SUBCUTANEOUS at 05:38

## 2019-03-05 RX ADMIN — LOPERAMIDE HYDROCHLORIDE 2 MG: 2 CAPSULE ORAL at 10:00

## 2019-03-05 RX ADMIN — LOPERAMIDE HYDROCHLORIDE 2 MG: 2 CAPSULE ORAL at 15:02

## 2019-03-05 RX ADMIN — Medication 10 ML: at 05:41

## 2019-03-05 RX ADMIN — HEPARIN SODIUM 5000 UNITS: 5000 INJECTION INTRAVENOUS; SUBCUTANEOUS at 15:03

## 2019-03-05 RX ADMIN — SODIUM CHLORIDE 100 ML/HR: 900 INJECTION, SOLUTION INTRAVENOUS at 10:02

## 2019-03-05 RX ADMIN — SODIUM CHLORIDE 100 ML/HR: 900 INJECTION, SOLUTION INTRAVENOUS at 00:23

## 2019-03-05 NOTE — PROGRESS NOTES
Problem: Interdisciplinary Rounds  Goal: Interdisciplinary Rounds  Interdisciplinary team rounds were held 3/5/2019 with the following team members:Care Management, Nursing, Nutrition, Pharmacy, Physical Therapy and Physician and the patient. Plan of care discussed. See clinical pathway and/or care plan for interventions and desired outcomes.

## 2019-03-05 NOTE — PROGRESS NOTES
OT note: orders received, chart reviewed, screen performed. Patient getting up in room feels like herself ( baseline ) now that  Pain controlled. No concerns expressed no deficts reported. No skilled OT indicated at this time. Patient plans to return home with   And support of daughter. Will discharge OT.  Thank  You for this referral. Zac Shepard OTR/L

## 2019-03-05 NOTE — PROGRESS NOTES
Pt requested dilaudid and 0.5mg given IV at this time for c/o withdrawals and itching. Will monitor for effectiveness.

## 2019-03-05 NOTE — PROGRESS NOTES
Problem: Mobility Impaired (Adult and Pediatric)  Goal: *Acute Goals and Plan of Care (Insert Text)    PHYSICAL THERAPY: Initial Assessment and AM 3/5/2019  OBSERVATION: PT Visit Days : (day of assessment)  Payor: BLUE CROSS / Plan: [place] [employer] [place] / Product Type: PPO /       NAME/AGE/GENDER: Jim Butler is a 59 y.o. female   PRIMARY DIAGNOSIS: Asthma exacerbation [J45.901]  Encephalopathy [G93.40] <principal problem not specified> <principal problem not specified>       ICD-10: Treatment Diagnosis:    · Difficulty in walking, Not elsewhere classified (R26.2)   Precaution/Allergies:  Patient has no known allergies. ASSESSMENT:     Ms. Mitchel Hill presents sitting up in chair on contact and agreeable to therapy assessment. She was admitted due to a pain pump malfunction and symptoms of withdrawal. She stated once she got here and was able to get some medication she is feeling back to normal. She does not state any numbness or tingling in her extremities. She was able to walk in campos 400 feet with supervision/independence-PT pushed IV pole. She states she has a plan to get her pump fixed tomorrow. She is moving well and feeling well. No skilled PT interventions indicated at this time nor any follow up therapy recommended. This section established at most recent assessment   PROBLEM LIST (Impairments causing functional limitations): 1. none   INTERVENTIONS PLANNED: (Benefits and precautions of physical therapy have been discussed with the patient.)  1. none     TREATMENT PLAN: Frequency/Duration: one time visit for assessment       RECOMMENDED REHABILITATION/EQUIPMENT: (at time of discharge pending progress): Due to the probability of continued deficits (see above) this patient will not likely need continued skilled physical therapy after discharge.   Equipment:    None at this time              HISTORY:   History of Present Injury/Illness (Reason for Referral):  PER MD NOTE: Patient is a 56yo F with hx chronic pain who presents with altered mental status. She has an intrathecal pain pump which has been malfunctioning for a week. She has been seen in her pain clinic in [facility] and confirmed her pump is malfunctioning. Most recent pump settings: dilaudid 6.4mg/day, bupivicaine 3.2mg/day, clonidine 16.9mcg/day. She was started on PO dilaudid and also lucemyra for withdrawal symptoms. The patient has continued to complain of crawling and itching sensation on skin and has become progressively less oriented. Initially was awake for several days, then became confused. History provided mostly by son. Past Medical History/Comorbidities:   Ms. Chicho Feliz  has no past medical history on file. Ms. Chicho Feliz  has no past surgical history on file. Social History/Living Environment:   Home Environment: Private residence  One/Two Story Residence: One story  Living Alone: No  Support Systems: Child(swathi), Family member(s)  Patient Expects to be Discharged to[de-identified] Private residence  Current DME Used/Available at Home: None  Tub or Shower Type: Tub/Shower combination  Prior Level of Function/Work/Activity:  Pt living at home, independent with gait and ADLs without assistive devices. She drives and helps take care of her spouse who has dementia     Number of Personal Factors/Comorbidities that affect the Plan of Care: 0: LOW COMPLEXITY   EXAMINATION:   Most Recent Physical Functioning:   Gross Assessment:  AROM: Generally decreased, functional  Strength:  Within functional limits               Posture:  Posture (WDL): Within defined limits  Balance:  Sitting: Intact  Standing: Intact Bed Mobility:  Supine to Sit: Independent(was up in chair, pt got up by herself)  Sit to Supine: (stayed up in chair)  Wheelchair Mobility:     Transfers:  Sit to Stand: Independent  Stand to Sit: Independent  Gait:     Distance (ft): 400 Feet (ft)  Ambulation - Level of Assistance: Independent;Supervision(therapist pushed IV pole)      Body Structures Involved:  1. None Body Functions Affected:  1. None Activities and Participation Affected:  1. None   Number of elements that affect the Plan of Care: 1-2: LOW COMPLEXITY   CLINICAL PRESENTATION:   Presentation: Stable and uncomplicated: LOW COMPLEXITY   CLINICAL DECISION MAKIN[address] [place] [id number] AM-PAC 6 Clicks   Basic Mobility Inpatient Short Form  How much difficulty does the patient currently have. .. Unable A Lot A Little None   1. Turning over in bed (including adjusting bedclothes, sheets and blankets)? [] 1   [] 2   [] 3   [x] 4   2. Sitting down on and standing up from a chair with arms ( e.g., wheelchair, bedside commode, etc.)   [] 1   [] 2   [] 3   [x] 4   3. Moving from lying on back to sitting on the side of the bed? [] 1   [] 2   [] 3   [x] 4   How much help from another person does the patient currently need. .. Total A Lot A Little None   4. Moving to and from a bed to a chair (including a wheelchair)? [] 1   [] 2   [] 3   [x] 4   5. Need to walk in hospital room? [] 1   [] 2   [] 3   [x] 4   6. Climbing 3-5 steps with a railing? [] 1   [] 2   [] 3   [x] 4   © , Trustees of [address] [place] [address], under license to [employer]. All rights reserved      Score:  Initial: 24 Most Recent: X (Date: -- )    Interpretation of Tool:  Represents activities that are increasingly more difficult (i.e. Bed mobility, Transfers, Gait). Medical Necessity:     · none. Reason for Services/Other Comments:  · none.    Use of outcome tool(s) and clinical judgement create a POC that gives a: Clear prediction of patient's progress: LOW COMPLEXITY            TREATMENT:   (In addition to Assessment/Re-Assessment sessions the following treatments were rendered)   Pre-treatment Symptoms/Complaints:  none  Pain: Initial:   Pain Intensity 1: 0  Post Session:  none     Assessment/Reassessment only, no treatment provided today    Braces/Orthotics/Lines/Etc:   · IV  · O2 Device: Room air  Treatment/Session Assessment:    · Response to Treatment:  Tolerated well  · Interdisciplinary Collaboration:   o Occupational Therapist  o Registered Nurse  · After treatment position/precautions:   o Up in chair  o Bed/Chair-wheels locked  o Call light within reach   · Recommendations/Intent for next treatment session:  eval and discharge  Total Treatment Duration:  PT Patient Time In/Time Out  Time In: 0855  Time Out: 12 [facility],

## 2019-03-05 NOTE — PROGRESS NOTES
Pt resting in bed and is alert and oriented x 4. She denies pain and is on room air. RR even and unlabored. IVF infusing. Call light in reach and pt instructed to call for assistance if needed. Will monitor.

## 2019-03-05 NOTE — PROGRESS NOTES
Hospitalist Progress Note    3/5/2019  Admit Date: 3/3/2019  5:28 PM   NAME: Danuta Morris   :  1955   MRN:  [id number]   Attending: Kp Lemus MD  PCP:  Rahat Saeed MD    SUBJECTIVE:     Danuta Morris is a 59years old female with pmhx chronic pain on chronic opioid medications admitted on 3/3 for opioid withdrawal. Pt reported having skin crawling and itching sensation, confusion. Pt was taking oral opioids for past 1 week since her pump stopped working. Pt was started on IV dilaudid and IV fluids in ER, following which symptoms resolved. Pt is scheduled for pump replacement with Dr. Adriana Su on  @ 09:00 AM.    3/5:  Pt seen at bedside  Reports having loose stools, 3-4 episodes this AM, small volume. Denies abdominal pain, nausea/vomiting. Pt counseled opioid withdrawal likely the cause of diarrhea. Review of Systems negative with exception of pertinent positives noted above      PHYSICAL EXAM       Visit Vitals  /84   Pulse 62   Temp 98.2 °F (36.8 °C)   Resp 18   Ht 5' 8\" (1.727 m)   Wt 72.6 kg (160 lb)   SpO2 96%   BMI 24.33 kg/m²      Temp (24hrs), Av.3 °F (36.8 °C), Min:98.1 °F (36.7 °C), Max:98.7 °F (37.1 °C)    Oxygen Therapy  O2 Sat (%): 96 % (19 0339)  Pulse via Oximetry: 50 beats per minute (19)  O2 Device: Room air (19 1555)    Intake/Output Summary (Last 24 hours) at 3/5/2019 0746  Last data filed at 3/4/2019 2100  Gross per 24 hour   Intake 1363 ml   Output    Net 1363 ml          General: No acute distress. Head:  Atraumatic Normocephalic. Eyes:  PERRLA, EOMI, Anicteric. ENT:  No discharges/lesions. Lungs:  CTA Bilaterally. CVS:  Regular rate and rhythm,  No murmur, rub, or gallop, No JVD, No lower   extremity edema. Abdomen: Soft, Non distended, Non tender, Positive bowel sounds. MSK:  No deformities, lesions, Spontaneously moves extremities.   Neurologic:  GCS 15, no motor or sensory loss, CN 2-12 intact  Psychiatry: AOx 3, mood and affect appropriate  Skin:   No rash/lesions. Good skin turgor  Heme/Lymph/Immune:  No petechiae, ecchymoses, overt signs of bleeding or    lymphadenopathy noted. No results found for this or any previous visit (from the past 24 hour(s)). Imaging /Procedures /Studies   All diagnostic procedure personally reviewed by me. EXAM: CT head without contrast.  ADDITIONAL CLINICAL INFORMATION: Altered mental status. COMPARISON: None.     Multiple axial images obtained through the brain without intravenous contrast.   Radiation dose reduction techniques were used for this study: All CT scans  performed at this facility use one or all of the following: Automated exposure  control, adjustment of the mA and/or kVp according to patient's size, iterative  reconstruction.     FINDINGS:     No evidence of intracranial mass, hemorrhage, or large territorial infarct. The  ventricles are normal in size and position. The basal cisterns are patent. No  extra-axial fluid collection or mass effect.      The orbital contents are within normal limits. The paranasal sinuses are clear. The mastoid air cells and middle ears are clear. No significant osseous or  extracranial soft tissue lesions.        IMPRESSION  IMPRESSION:  1. No evidence of acute intracranial abnormality. ASSESSMENT      Hospital Problems as of 3/5/2019 Never Reviewed          Codes Class Noted - Resolved POA    [facility] ICD-10-CM: F11.23  ICD-9-CM: 292.0, 304.00  3/4/2019 - Present Unknown        Acute metabolic encephalopathy BKW-24-NW: G93.41  ICD-9-CM: 348.31  3/3/2019 - Present                   Plan:  - continue conservative treatment with IV fluids and symptomatic treatment for withdrawal with IV dilaudid. - pt will be discharged on March 6.  She is scheduled for pain med pump replacement by Dr. Karlie Everett @ 09:00 AM  Continue other treatment as ordered  Regular diet      DVT Prophylaxis: HEPARIN  High risk with opioids  Dispo: discharge to home in AM    Keenan Suarez MD

## 2019-03-05 NOTE — PROGRESS NOTES
Patient requested something to help her sleep tonight.  Dr. Syl Romano notified and order received for Ambien 5 mg PRN HS

## 2019-03-06 VITALS
RESPIRATION RATE: 18 BRPM | SYSTOLIC BLOOD PRESSURE: 152 MMHG | WEIGHT: 160 LBS | TEMPERATURE: 98.8 F | DIASTOLIC BLOOD PRESSURE: 82 MMHG | HEIGHT: 68 IN | BODY MASS INDEX: 24.25 KG/M2 | OXYGEN SATURATION: 99 % | HEART RATE: 65 BPM

## 2019-03-06 PROCEDURE — 96376 TX/PRO/DX INJ SAME DRUG ADON: CPT

## 2019-03-06 PROCEDURE — 74011250636 HC RX REV CODE- 250/636: Performed by: FAMILY MEDICINE

## 2019-03-06 PROCEDURE — 77030020263 HC SOL INJ SOD CL0.9% LFCR 1000ML

## 2019-03-06 PROCEDURE — 74011250636 HC RX REV CODE- 250/636: Performed by: HOSPITALIST

## 2019-03-06 PROCEDURE — 96361 HYDRATE IV INFUSION ADD-ON: CPT

## 2019-03-06 PROCEDURE — 74011250637 HC RX REV CODE- 250/637: Performed by: HOSPITALIST

## 2019-03-06 PROCEDURE — 99218 HC RM OBSERVATION: CPT

## 2019-03-06 RX ADMIN — HYDROMORPHONE HYDROCHLORIDE 0.5 MG: 2 INJECTION, SOLUTION INTRAMUSCULAR; INTRAVENOUS; SUBCUTANEOUS at 00:29

## 2019-03-06 RX ADMIN — OXYCODONE AND ACETAMINOPHEN 1 TABLET: 5; 325 TABLET ORAL at 03:00

## 2019-03-06 RX ADMIN — Medication 10 ML: at 06:25

## 2019-03-06 RX ADMIN — HYDROMORPHONE HYDROCHLORIDE 0.5 MG: 2 INJECTION, SOLUTION INTRAMUSCULAR; INTRAVENOUS; SUBCUTANEOUS at 04:36

## 2019-03-06 RX ADMIN — SODIUM CHLORIDE 100 ML/HR: 900 INJECTION, SOLUTION INTRAVENOUS at 04:35

## 2019-03-06 NOTE — DISCHARGE SUMMARY
Hospitalist Discharge Summary     Admit Date:  3/3/2019  5:28 PM   Name:  Juan C Cabezas   Age:  59 y.o.  :  1955   MRN:  [id number]   PCP:  Rob Carvajal MD  Treatment Team: Care Manager: Desire Teresa RN    Problem List for this Hospitalization:  Hospital Problems as of 3/6/2019 Never Reviewed          Codes Class Noted - Resolved POA    Opioid withdrawal ([facility] ICD-10-CM: F11.23  ICD-9-CM: 292.0, 304.00  3/4/2019 - Present Unknown        Acute metabolic encephalopathy JQJ-51-UE: G93.41  ICD-9-CM: 348.31  3/3/2019 - Present                 Admission HPI from 3/3/2019:    Patient is a 58yo F with hx chronic pain who presents with altered mental status. She has an intrathecal pain pump which has been malfunctioning for a week. She has been seen in her pain clinic in [facility] and confirmed her pump is malfunctioning. Most recent pump settings: dilaudid 6.4mg/day, bupivicaine 3.2mg/day, clonidine 16.9mcg/day. She was started on PO dilaudid and also lucemyra for withdrawal symptoms. The patient has continued to complain of crawling and itching sensation on skin and has become progressively less oriented. Initially was awake for several days, then became confused. History provided mostly by son. Hospital Course: This is a 70-year-old female with a past medical history as mentioned above admitted to the hospital for opiate withdrawal symptoms after she noted that her pump stopped working. Patient was given IV Dilaudid and IV fluids in the ER with which her symptoms are improved. She has a pump replacement procedures scheduled today with Dr. Logan Bence at 9 AM.  Patient is currently being discharged so that she can follow-up with the the surgeon and have the pump placed and continue with her pain pump. Overall she is medically stable to be discharged now. Follow up instructions below. Plan was discussed with patient. All questions answered.   Patient was stable at time of discharge and was instructed to call or return if there are any concerns or recurrence of symptoms. Diagnostic Imaging/Tests:   Ct Head Wo Cont    Result Date: 3/3/2019  EXAM: CT head without contrast. ADDITIONAL CLINICAL INFORMATION: Altered mental status. COMPARISON: None. Multiple axial images obtained through the brain without intravenous contrast. Radiation dose reduction techniques were used for this study: All CT scans performed at this facility use one or all of the following: Automated exposure control, adjustment of the mA and/or kVp according to patient's size, iterative reconstruction. FINDINGS: No evidence of intracranial mass, hemorrhage, or large territorial infarct. The ventricles are normal in size and position. The basal cisterns are patent. No extra-axial fluid collection or mass effect. The orbital contents are within normal limits. The paranasal sinuses are clear. The mastoid air cells and middle ears are clear. No significant osseous or extracranial soft tissue lesions. IMPRESSION: 1. No evidence of acute intracranial abnormality. Echocardiogram results:  No results found for this visit on 03/03/19.       All Micro Results     None          Labs: Results:       BMP, Mg, Phos Recent Labs     03/04/19  0552 03/03/19  1739    136   K 3.5 4.9   * 106   CO2 22 20*   AGAP 9 10   BUN 18 20   CREA 0.79 0.86   CA 8.4 9.9   * 110*      CBC Recent Labs     03/04/19  0552 03/03/19  1739   WBC 6.5 9.3   RBC 4.11 5.13   HGB 12.0 15.0   HCT 36.9 44.0    256   GRANS  --  59   LYMPH  --  34   EOS  --  0*   MONOS  --  7   BASOS  --  1   IG  --  0   ANEU  --  5.5   ABL  --  3.1   LADONNA  --  0.0   ABM  --  0.6   ABB  --  0.1   AIG  --  0.0      LFT Recent Labs     03/03/19  1739   SGOT 33   ALT 25   AP 69   TP 8.4   ALB 4.4   GLOB 4.0*   AGRAT 1.1      Cardiac Testing Lab Results   Component Value Date/Time    Troponin-I, Qt. <0.02 (L) 03/03/2019 05:39 PM      Coagulation Tests No results found for: PTP, INR, APTT   A1c No results found for: HBA1C, HGBE8, YNS4WVRH   Lipid Panel No results found for: CHOL, CHOLPOCT, CHOLX, CHLST, CHOLV, 667009, HDL, LDL, LDLC, DLDLP, 795288, VLDLC, VLDL, TGLX, TRIGL, TRIGP, TGLPOCT, CHHD, CHHDX   Thyroid Panel No results found for: T4, T3U, TSH, TSHEXT     Most Recent UA No results found for: COLOR, APPRN, REFSG, CRISTIANO, PROTU, GLUCU, KETU, BILU, BLDU, UROU, MICHELLE, LEUKU     No Known Allergies    There is no immunization history on file for this patient. All Labs from Last 24 Hrs:  No results found for this or any previous visit (from the past 24 hour(s)). Discharge Exam:  Patient Vitals for the past 24 hrs:   Temp Pulse Resp BP SpO2   03/06/19 0732 98.8 °F (37.1 °C) 65 18 152/82 99 %   03/06/19 0338 98.2 °F (36.8 °C) 65 18 148/70 96 %   03/06/19 0017 98.2 °F (36.8 °C) 60 18 140/79 96 %   03/05/19 2033 98.2 °F (36.8 °C) 60 18 154/82 95 %   03/05/19 1524 98.6 °F (37 °C) 72 18 (!) 154/94 99 %   03/05/19 1128 98.9 °F (37.2 °C) 72 18 (!) 149/92 96 %     Oxygen Therapy  O2 Sat (%): 99 % (03/06/19 0732)  Pulse via Oximetry: 50 beats per minute (03/03/19 2015)  O2 Device: Room air (03/04/19 1555)  No intake or output data in the 24 hours ending 03/06/19 0851        General:- Conscious, No acute distress   Lungs- CTA Bilaterally, No significant wheezing  Heart:- S1 S2 regular  Abdomen:- Soft, Positive bowel sounds, NTND, No guarding/rigidity/rebound tend. Extremities:-No pedal edema  Neurologic: - AOX3, No acute FND,  Psych: - Appropriate mood      Discharge Info:   Discharge Medication List as of 3/6/2019  7:52 AM      CONTINUE these medications which have NOT CHANGED    Details   HYDROmorphone in NS, PF, (DILAUDID) 1 mg/mL soln PCA 6.4 mg by IntraVENous route continuous. , Historical Med      !! OTHER,NON-FORMULARY, Historical Med      !! OTHER,NON-FORMULARY, Historical Med      dicyclomine (BENTYL) 20 mg tablet Take 20 mg by mouth every six (6) hours. , Historical Med HYDROmorphone (DILAUDID) 4 mg tablet Take 4 mg by mouth two (2) times a day., Historical Med      promethazine (PHENERGAN) 25 mg tablet Take 25 mg by mouth every eight (8) hours as needed for Nausea., Historical Med       !! - Potential duplicate medications found. Please discuss with provider. Disposition: home    Activity: Activity as tolerated  Diet: DIET REGULAR    Follow-up Information     Follow up With Specialties Details Why Contact Info    Linda Jordan MD [place] [address] [address]  [phone]              Time spent in patient discharge planning and coordination 25  minutes.     Signed:  Gabriele Sanchez MD

## 2019-03-06 NOTE — PROGRESS NOTES
Problem: Falls - Risk of  Goal: *Absence of Falls  Document [place] Fall Risk and appropriate interventions in the flowsheet.   Fall Risk Interventions:  Mobility Interventions: Patient to call before getting OOB    Mentation Interventions: Bed/chair exit alarm    Medication Interventions: Bed/chair exit alarm    Elimination Interventions: Call light in reach    History of Falls Interventions: Bed/chair exit alarm

## 2019-03-06 NOTE — PROGRESS NOTES
Pt discharged home. Received home meds. IV removed cath tip intact. Pt verbalized discharge instructions and is aware to call when ready to leave, no distress noted at this time.

## 2019-03-06 NOTE — DISCHARGE INSTRUCTIONS
DISCHARGE SUMMARY from Nurse    PATIENT INSTRUCTIONS:    After general anesthesia or intravenous sedation, for 24 hours or while taking prescription Narcotics:  · Limit your activities  · Do not drive and operate hazardous machinery  · Do not make important personal or business decisions  · Do  not drink alcoholic beverages  · If you have not urinated within 8 hours after discharge, please contact your surgeon on call. Report the following to your surgeon:  · Excessive pain, swelling, redness or odor of or around the surgical area  · Temperature over 100.5  · Nausea and vomiting lasting longer than 4 hours or if unable to take medications  · Any signs of decreased circulation or nerve impairment to extremity: change in color, persistent  numbness, tingling, coldness or increase pain  · Any questions    What to do at Home:  Recommended activity: Activity as tolerated, and as told by your doctor. If you experience any of the following symptoms listed below, please follow up with your doctor. *  Please give a list of your current medications to your Primary Care Provider. *  Please update this list whenever your medications are discontinued, doses are      changed, or new medications (including over-the-counter products) are added. *  Please carry medication information at all times in case of emergency situations. These are general instructions for a healthy lifestyle:    No smoking/ No tobacco products/ Avoid exposure to second hand smoke  Surgeon General's Warning:  Quitting smoking now greatly reduces serious risk to your health.     Obesity, smoking, and sedentary lifestyle greatly increases your risk for illness    A healthy diet, regular physical exercise & weight monitoring are important for maintaining a healthy lifestyle    You may be retaining fluid if you have a history of heart failure or if you experience any of the following symptoms:  Weight gain of 3 pounds or more overnight or 5 pounds in a week, increased swelling in our hands or feet or shortness of breath while lying flat in bed. Please call your doctor as soon as you notice any of these symptoms; do not wait until your next office visit. Recognize signs and symptoms of STROKE:    F-face looks uneven    A-arms unable to move or move unevenly    S-speech slurred or non-existent    T-time-call 911 as soon as signs and symptoms begin-DO NOT go       Back to bed or wait to see if you get better-TIME IS BRAIN. Warning Signs of HEART ATTACK     Call 911 if you have these symptoms:   Chest discomfort. Most heart attacks involve discomfort in the center of the chest that lasts more than a few minutes, or that goes away and comes back. It can feel like uncomfortable pressure, squeezing, fullness, or pain.  Discomfort in other areas of the upper body. Symptoms can include pain or discomfort in one or both arms, the back, neck, jaw, or stomach.  Shortness of breath with or without chest discomfort.  Other signs may include breaking out in a cold sweat, nausea, or lightheadedness. Don't wait more than five minutes to call 911 - MINUTES MATTER! Fast action can save your life. Calling 911 is almost always the fastest way to get lifesaving treatment. Emergency Medical Services staff can begin treatment when they arrive -- up to an hour sooner than if someone gets to the hospital by car. The discharge information has been reviewed with the patient. The patient verbalized understanding. Discharge medications reviewed with the patientPatient Education        Opioid Withdrawal: Care Instructions  Your Care Instructions  Opioids are strong pain medicines. Examples include hydrocodone, oxycodone, fentanyl, and morphine. Heroin is an example of an illegal opioid. Your body gets used to this type of drug if you take it all the time. This is called being dependent on the drug.  And when you stop taking it, you go through withdrawal.  Withdrawal symptoms can include nausea, sweating, chills, diarrhea, stomach cramps, and muscle aches. Withdrawal can last up to several weeks, depending on which drug you took and how long you took it. You may feel very ill, but you are probably not in medical danger. Withdrawal isn't easy, but there are things you can do to help you cope with the symptoms. You will feel a little bit better each day as your body adjusts and heals itself. Follow-up care is a key part of your treatment and safety. Be sure to make and go to all appointments, and call your doctor if you are having problems. It's also a good idea to know your test results and keep a list of the medicines you take. How can you care for yourself at home? · Your doctor may give you medicine to help you feel better. Read and follow all instructions on the label. · To help get through withdrawal, you can also:  ? Get plenty of rest.  ? Drink plenty of fluids. ? Stay active, but don't tire yourself. ? Eat a healthy diet. · Do not drink alcohol or take illegal drugs. · Do not take medications that make you tired, like sleeping pills or muscle relaxers. · Talk to your doctor about drug treatment programs to help you stay drug-free. · Talk to your doctor or pharmacist about having a naloxone rescue kit on hand. Remember after you stop taking an opioid, even for a short time, your body gets used to not having this type of drug. If you return to taking the same amount of an opioid as you did before you stopped, you could be at a higher risk for overdose. When should you call for help? Call 911 anytime you think you may need emergency care. For example, call if:    · You feel you cannot stop from hurting yourself or someone else.   [facility] your doctor now or seek immediate medical care if:    · You have new or worse withdrawal symptoms that you can't manage at home, such as:  ? Stomach cramps. ? Vomiting. ? Diarrhea.   ? Muscle aches.  ? Sweating.    Watch closely for changes in your health, and be sure to contact your doctor if:    · You do not get better as expected. Where can you learn more? Go to http://nelly-hussein.info/. Enter E242 in the search box to learn more about \"Opioid Withdrawal: Care Instructions. \"  Current as of: May 7, 2018  Content Version: 11.9  © 2006-2018 [employer]. Care instructions adapted under license by [employer] (which disclaims liability or warranty for this information). If you have questions about a medical condition or this instruction, always ask your healthcare professional. [address] any warranty or liability for your use of this information. and appropriate educational materials and side effects teaching were provided.   ___________________________________________________________________________________________________________________________________

## 2019-03-06 NOTE — PROGRESS NOTES
Shift assessment complete via flowsheet. A&O. No s/sx of distress. Denies pain/discomfort. Encouraged to call for assistance/needs. Call light in reach.

## 2019-03-06 NOTE — PROGRESS NOTES
Problem: Falls - Risk of  Goal: *Absence of Falls  Document Abiola Fall Risk and appropriate interventions in the flowsheet.   Fall Risk Interventions:  Mobility Interventions: Patient to call before getting OOB    Mentation Interventions: Bed/chair exit alarm    Medication Interventions: Bed/chair exit alarm, Patient to call before getting OOB, Teach patient to arise slowly    Elimination Interventions: Call light in reach    History of Falls Interventions: Bed/chair exit alarm